# Patient Record
Sex: MALE | Race: WHITE | NOT HISPANIC OR LATINO | URBAN - METROPOLITAN AREA
[De-identification: names, ages, dates, MRNs, and addresses within clinical notes are randomized per-mention and may not be internally consistent; named-entity substitution may affect disease eponyms.]

---

## 2017-07-20 ENCOUNTER — INPATIENT (INPATIENT)
Facility: HOSPITAL | Age: 42
LOS: 0 days | Discharge: HOME | End: 2017-07-21
Attending: EMERGENCY MEDICINE

## 2017-07-27 DIAGNOSIS — R42 DIZZINESS AND GIDDINESS: ICD-10-CM

## 2017-07-27 DIAGNOSIS — R07.9 CHEST PAIN, UNSPECIFIED: ICD-10-CM

## 2017-07-27 DIAGNOSIS — K21.9 GASTRO-ESOPHAGEAL REFLUX DISEASE WITHOUT ESOPHAGITIS: ICD-10-CM

## 2017-07-27 DIAGNOSIS — R55 SYNCOPE AND COLLAPSE: ICD-10-CM

## 2017-07-27 DIAGNOSIS — R10.31 RIGHT LOWER QUADRANT PAIN: ICD-10-CM

## 2017-07-27 DIAGNOSIS — R06.02 SHORTNESS OF BREATH: ICD-10-CM

## 2018-01-12 ENCOUNTER — OUTPATIENT (OUTPATIENT)
Dept: OUTPATIENT SERVICES | Facility: HOSPITAL | Age: 43
LOS: 1 days | Discharge: HOME | End: 2018-01-12

## 2018-01-12 DIAGNOSIS — K21.9 GASTRO-ESOPHAGEAL REFLUX DISEASE WITHOUT ESOPHAGITIS: ICD-10-CM

## 2020-01-30 ENCOUNTER — TRANSCRIPTION ENCOUNTER (OUTPATIENT)
Age: 45
End: 2020-01-30

## 2020-01-31 ENCOUNTER — OUTPATIENT (OUTPATIENT)
Dept: OUTPATIENT SERVICES | Facility: HOSPITAL | Age: 45
LOS: 1 days | End: 2020-01-31

## 2020-01-31 ENCOUNTER — APPOINTMENT (OUTPATIENT)
Dept: OPHTHALMOLOGY | Facility: CLINIC | Age: 45
End: 2020-01-31

## 2020-02-03 DIAGNOSIS — H18.839 RECURRENT EROSION OF CORNEA, UNSPECIFIED EYE: ICD-10-CM

## 2020-02-03 DIAGNOSIS — H18.53 GRANULAR CORNEAL DYSTROPHY: ICD-10-CM

## 2021-07-09 PROBLEM — Z00.00 ENCOUNTER FOR PREVENTIVE HEALTH EXAMINATION: Status: ACTIVE | Noted: 2021-07-09

## 2021-07-30 ENCOUNTER — TRANSCRIPTION ENCOUNTER (OUTPATIENT)
Age: 46
End: 2021-07-30

## 2021-07-30 ENCOUNTER — INPATIENT (INPATIENT)
Facility: HOSPITAL | Age: 46
LOS: 2 days | Discharge: HOME | End: 2021-08-02
Attending: STUDENT IN AN ORGANIZED HEALTH CARE EDUCATION/TRAINING PROGRAM | Admitting: STUDENT IN AN ORGANIZED HEALTH CARE EDUCATION/TRAINING PROGRAM
Payer: COMMERCIAL

## 2021-07-30 VITALS
HEART RATE: 58 BPM | TEMPERATURE: 98 F | SYSTOLIC BLOOD PRESSURE: 128 MMHG | HEIGHT: 69 IN | WEIGHT: 162.92 LBS | DIASTOLIC BLOOD PRESSURE: 77 MMHG | RESPIRATION RATE: 18 BRPM | OXYGEN SATURATION: 98 %

## 2021-07-30 DIAGNOSIS — K21.9 GASTRO-ESOPHAGEAL REFLUX DISEASE WITHOUT ESOPHAGITIS: Chronic | ICD-10-CM

## 2021-07-30 LAB
ANION GAP SERPL CALC-SCNC: 9 MMOL/L — SIGNIFICANT CHANGE UP (ref 7–14)
APTT BLD: 30.4 SEC — SIGNIFICANT CHANGE UP (ref 27–39.2)
BUN SERPL-MCNC: 16 MG/DL — SIGNIFICANT CHANGE UP (ref 10–20)
CALCIUM SERPL-MCNC: 9.4 MG/DL — SIGNIFICANT CHANGE UP (ref 8.5–10.1)
CHLORIDE SERPL-SCNC: 100 MMOL/L — SIGNIFICANT CHANGE UP (ref 98–110)
CK MB CFR SERPL CALC: 1.6 NG/ML — SIGNIFICANT CHANGE UP (ref 0.6–6.3)
CK SERPL-CCNC: 84 U/L — SIGNIFICANT CHANGE UP (ref 0–225)
CO2 SERPL-SCNC: 26 MMOL/L — SIGNIFICANT CHANGE UP (ref 17–32)
CREAT SERPL-MCNC: 0.8 MG/DL — SIGNIFICANT CHANGE UP (ref 0.7–1.5)
GLUCOSE SERPL-MCNC: 108 MG/DL — HIGH (ref 70–99)
HCT VFR BLD CALC: 43.6 % — SIGNIFICANT CHANGE UP (ref 42–52)
HGB BLD-MCNC: 15.3 G/DL — SIGNIFICANT CHANGE UP (ref 14–18)
INR BLD: 0.98 RATIO — SIGNIFICANT CHANGE UP (ref 0.65–1.3)
MAGNESIUM SERPL-MCNC: 1.8 MG/DL — SIGNIFICANT CHANGE UP (ref 1.8–2.4)
MCHC RBC-ENTMCNC: 31.2 PG — HIGH (ref 27–31)
MCHC RBC-ENTMCNC: 35.1 G/DL — SIGNIFICANT CHANGE UP (ref 32–37)
MCV RBC AUTO: 89 FL — SIGNIFICANT CHANGE UP (ref 80–94)
NRBC # BLD: 0 /100 WBCS — SIGNIFICANT CHANGE UP (ref 0–0)
NT-PROBNP SERPL-SCNC: 17 PG/ML — SIGNIFICANT CHANGE UP (ref 0–300)
PLATELET # BLD AUTO: 181 K/UL — SIGNIFICANT CHANGE UP (ref 130–400)
POTASSIUM SERPL-MCNC: 4.2 MMOL/L — SIGNIFICANT CHANGE UP (ref 3.5–5)
POTASSIUM SERPL-SCNC: 4.2 MMOL/L — SIGNIFICANT CHANGE UP (ref 3.5–5)
PROTHROM AB SERPL-ACNC: 11.3 SEC — SIGNIFICANT CHANGE UP (ref 9.95–12.87)
RAPID RVP RESULT: SIGNIFICANT CHANGE UP
RBC # BLD: 4.9 M/UL — SIGNIFICANT CHANGE UP (ref 4.7–6.1)
RBC # FLD: 12.2 % — SIGNIFICANT CHANGE UP (ref 11.5–14.5)
SARS-COV-2 RNA SPEC QL NAA+PROBE: SIGNIFICANT CHANGE UP
SODIUM SERPL-SCNC: 135 MMOL/L — SIGNIFICANT CHANGE UP (ref 135–146)
TROPONIN T SERPL-MCNC: <0.01 NG/ML — SIGNIFICANT CHANGE UP
TROPONIN T SERPL-MCNC: <0.01 NG/ML — SIGNIFICANT CHANGE UP
WBC # BLD: 5.77 K/UL — SIGNIFICANT CHANGE UP (ref 4.8–10.8)
WBC # FLD AUTO: 5.77 K/UL — SIGNIFICANT CHANGE UP (ref 4.8–10.8)

## 2021-07-30 PROCEDURE — 93010 ELECTROCARDIOGRAM REPORT: CPT

## 2021-07-30 PROCEDURE — 99223 1ST HOSP IP/OBS HIGH 75: CPT

## 2021-07-30 PROCEDURE — 71045 X-RAY EXAM CHEST 1 VIEW: CPT | Mod: 26

## 2021-07-30 PROCEDURE — 99285 EMERGENCY DEPT VISIT HI MDM: CPT

## 2021-07-30 RX ORDER — PANTOPRAZOLE SODIUM 20 MG/1
40 TABLET, DELAYED RELEASE ORAL
Refills: 0 | Status: DISCONTINUED | OUTPATIENT
Start: 2021-07-30 | End: 2021-08-02

## 2021-07-30 RX ORDER — ESOMEPRAZOLE MAGNESIUM 40 MG/1
1 CAPSULE, DELAYED RELEASE ORAL
Qty: 0 | Refills: 0 | DISCHARGE

## 2021-07-30 RX ORDER — ATORVASTATIN CALCIUM 80 MG/1
40 TABLET, FILM COATED ORAL AT BEDTIME
Refills: 0 | Status: DISCONTINUED | OUTPATIENT
Start: 2021-07-30 | End: 2021-08-02

## 2021-07-30 RX ORDER — ASPIRIN/CALCIUM CARB/MAGNESIUM 324 MG
324 TABLET ORAL ONCE
Refills: 0 | Status: COMPLETED | OUTPATIENT
Start: 2021-07-30 | End: 2021-07-30

## 2021-07-30 RX ORDER — METOPROLOL TARTRATE 50 MG
12.5 TABLET ORAL EVERY 12 HOURS
Refills: 0 | Status: DISCONTINUED | OUTPATIENT
Start: 2021-07-30 | End: 2021-08-02

## 2021-07-30 RX ORDER — ENOXAPARIN SODIUM 100 MG/ML
40 INJECTION SUBCUTANEOUS DAILY
Refills: 0 | Status: DISCONTINUED | OUTPATIENT
Start: 2021-07-30 | End: 2021-08-02

## 2021-07-30 RX ORDER — CHLORHEXIDINE GLUCONATE 213 G/1000ML
1 SOLUTION TOPICAL
Refills: 0 | Status: DISCONTINUED | OUTPATIENT
Start: 2021-07-30 | End: 2021-08-02

## 2021-07-30 RX ORDER — ASPIRIN/CALCIUM CARB/MAGNESIUM 324 MG
81 TABLET ORAL DAILY
Refills: 0 | Status: DISCONTINUED | OUTPATIENT
Start: 2021-07-31 | End: 2021-08-02

## 2021-07-30 RX ORDER — IBUPROFEN 200 MG
600 TABLET ORAL EVERY 6 HOURS
Refills: 0 | Status: DISCONTINUED | OUTPATIENT
Start: 2021-07-30 | End: 2021-08-02

## 2021-07-30 RX ORDER — ESOMEPRAZOLE MAGNESIUM 40 MG/1
0 CAPSULE, DELAYED RELEASE ORAL
Qty: 0 | Refills: 0 | DISCHARGE

## 2021-07-30 RX ADMIN — Medication 324 MILLIGRAM(S): at 14:03

## 2021-07-30 NOTE — ED PROVIDER NOTE - PHYSICAL EXAMINATION
LOS:     VITALS:   T(C): 36.7 (07-30-21 @ 12:54), Max: 36.7 (07-30-21 @ 12:54)  HR: 58 (07-30-21 @ 12:54) (58 - 58)  BP: 128/77 (07-30-21 @ 12:54) (128/77 - 128/77)  RR: 18 (07-30-21 @ 12:54) (18 - 18)  SpO2: 98% (07-30-21 @ 12:54) (98% - 98%)    GENERAL: NAD, lying in bed comfortably  HEAD:  Atraumatic, Normocephalic  EYES: EOMI, PERRLA, conjunctiva and sclera clear  ENT: Moist mucous membranes  NECK: Supple, No JVD  CHEST/LUNG: Clear to auscultation bilaterally; No rales, rhonchi, wheezing, or rubs. Unlabored respirations  HEART: Regular rate and rhythm; No murmurs, rubs, or gallops  ABDOMEN: BSx4; Soft, nontender, nondistended  EXTREMITIES:  2+ Peripheral Pulses, brisk capillary refill. No clubbing, cyanosis, or edema  NERVOUS SYSTEM:  A&Ox3, no focal deficits   SKIN: No rashes or lesions

## 2021-07-30 NOTE — ED PROVIDER NOTE - ATTENDING CONTRIBUTION TO CARE
I personally evaluated the patient. I reviewed the Resident’s or Physician Assistant’s note (as assigned above), and agree with the findings and plan except as documented in my note.     46 male here for chest pain with exertion, has been present for about one week, worse today.     ROS otherwise unremarkable    PE: male in no distress. CV: pulses intact. CHEST: normal work of breathing. ABD: nondistended. SKIN: normal. EXT: FROM. NEURO: AAO 3 no focal deficits. HEENT: mucosa normal    Impression: chest pain    Plan: IV labs imaging supportive care and reevaluation

## 2021-07-30 NOTE — H&P ADULT - NSHPLABSRESULTS_GEN_ALL_CORE
15.3   5.77  )-----------( 181      ( 30 Jul 2021 13:10 )             43.6       07-30    135  |  100  |  16  ----------------------------<  108<H>  4.2   |  26  |  0.8    Ca    9.4      30 Jul 2021 13:10  Mg     1.8     07-30            Magnesium, Serum: 1.8 mg/dL (07-30-21 @ 13:10)        PT/INR - ( 30 Jul 2021 13:10 )   PT: 11.30 sec;   INR: 0.98 ratio         PTT - ( 30 Jul 2021 13:10 )  PTT:30.4 sec    Lactate Trend      CARDIAC MARKERS ( 30 Jul 2021 13:10 )  x     / <0.01 ng/mL / x     / x     / x          Xray Chest 1 View-PORTABLE IMMEDIATE (Xray Chest 1 View-PORTABLE IMMEDIATE .) (07.30.21 @ 13:58)   Impression:  No radiographic evidence of acute cardiopulmonary disease.

## 2021-07-30 NOTE — H&P ADULT - HISTORY OF PRESENT ILLNESS
Patient is a 46 year old Male with a past medical history of GERD S/P Nix Acid Reflux Surgery 2017 presented to the ER with a chief complaint of Chest pain x 1 week. Patient describes Left mid-pectoral non-radiating (initially towards Left shoulder then resolved) constant abrupt in onset 5/10 squeezing pressure like heavy chest pain, originated after jogging. Patient endorses that chief complaint is alleviated with resting, provoked by physical activity such as walking for a while and jogging. Patient denies fevers, chills, nausea, vomiting, new onset of headaches, visual changes, auditory changes, sore throat, neck stiffness, shortness of breath, palpitations, abdominal pain, flank pain, new onset of / worsening low back pain, diarrhea, constipation, dysuria, new onset of leg swelling, new onset of rashes, abnormal gait.

## 2021-07-30 NOTE — H&P ADULT - NSHPPHYSICALEXAM_GEN_ALL_CORE
VITAL SIGNS: Vital Signs Last 24 Hrs  T(C): 36.4 (30 Jul 2021 15:18), Max: 36.7 (30 Jul 2021 12:54)  T(F): 97.6 (30 Jul 2021 15:18), Max: 98 (30 Jul 2021 12:54)  HR: 66 (30 Jul 2021 15:18) (58 - 66)  BP: 116/62 (30 Jul 2021 15:18) (116/62 - 128/77)  RR: 100 (30 Jul 2021 15:18) (18 - 100)  SpO2: 98% (30 Jul 2021 12:54) (98% - 98%)    GENERAL: NAD, lying in bed  HEAD:  Atraumatic, Normocephalic  EYES: Conjunctiva and sclera clear  ENT: Moist mucous membranes  NECK: Supple  CHEST/LUNG: Clear to auscultation bilaterally. Unlabored respirations  HEART: Regular rate and rhythm  ABDOMEN: Soft, Nontender, Nondistended  EXTREMITIES:  No calf tenderness   NERVOUS SYSTEM:  Alert & Oriented X3, speech clear  MSK: FROM all 4 extremities, full and equal strength  SKIN: No rashes or lesions

## 2021-07-30 NOTE — ED PROVIDER NOTE - NS ED ROS FT
REVIEW OF SYSTEMS:    CONSTITUTIONAL: No weakness, fevers or chills  EYES/ENT: No visual changes;  No vertigo or throat pain   NECK: No pain or stiffness  RESPIRATORY: No cough, wheezing, hemoptysis; No shortness of breath  CARDIOVASCULAR: chest pain , worse on exertion  GASTROINTESTINAL: No abdominal or epigastric pain. No nausea, vomiting, or hematemesis; No diarrhea or constipation. No melena or hematochezia.  GENITOURINARY: No dysuria, frequency or hematuria  NEUROLOGICAL: No numbness or weakness  SKIN: No itching, rashes

## 2021-07-30 NOTE — ED ADULT NURSE NOTE - NSIMPLEMENTINTERV_GEN_ALL_ED
Implemented All Universal Safety Interventions:  [place] to call system. Call bell, personal items and telephone within reach. Instruct patient to call for assistance. Room bathroom lighting operational. Non-slip footwear when patient is off stretcher. Physically safe environment: no spills, clutter or unnecessary equipment. Stretcher in lowest position, wheels locked, appropriate side rails in place.

## 2021-07-30 NOTE — ED PROVIDER NOTE - OBJECTIVE STATEMENT
46 years old male with PMHx of GERD presents to ED for the chest pain since last one week.  Chest pain started about a week ago while he was running, left sided, non-radiating and got releived on rest. Since then he experiences pain when he tries to brisk walk. Pain is mid-sternal now, feels like squeezing/pressure like with associated left shoulder pain, light-headedness, dizziness. Today he went to urgent care [facility] from where he was sent to ED. Patient denies any SOB, palpitation, nausea, vomitting, diarrhea, constipation, family Hx of cardiac disease, smoking. 46 years old male with PMHx of GERD presents to ED for the chest pain since last one week.  Chest pain started about a week ago while he was running, left sided, non-radiating and got releived on rest. Since then he has been experiencing pain when he tries to brisk walk/exert. Pain is mid-sternal now, feels like squeezing/pressure like with associated left shoulder pain, light-headedness, dizziness. Today he went to urgent care center from where he was sent to ED. Patient denies any SOB, palpitation, nausea, vomitting, diarrhea, constipation, family Hx of cardiac disease, smoking.

## 2021-07-30 NOTE — H&P ADULT - ASSESSMENT
Patient is a 46 year old Male with a past medical history of GERD S/P Nix Acid Reflux Surgery 2017 presented to the ER with a chief complaint of Chest pain x 1 week.    # Chest pain, rule out ACS  - Tele monitoring for cardiac arrhythmias  - Transthoracic echocardiogram    - Three sets of Cardiac Enzymes to rule out MI  - Will order EKG   - CXR: No radiographic evidence of acute cardiopulmonary disease.   - Will order AM labs including CBC, CMP  - Aspirin 81mg, Lipitor 40mg   - Lipid profile in AM   - Hemoglobin A1C in AM for risk stratification   - Metoprolol 12.5 BID   - NSAIDs for pain control   - Cardiology consult     # GERD   - Nexium is nonformulary, will give Protonix equivalent   - GERD diet     # General admission orders   - Monitor Vital Signs   - Activity Order   - DVT PPX (Lovenox)  - GI PPX (Protonix)  - CHG 4% Bath QD and PRN  - Discussed the above case and plan with the Attending

## 2021-07-30 NOTE — ED PROVIDER NOTE - CLINICAL SUMMARY MEDICAL DECISION MAKING FREE TEXT BOX
46 male here for chest pain with exertion. No prior workup. Had screening labs imaging cardiac monitoring medications and reevaluation, plan is for inpatient admission to monitored setting for continued management. No acute emergent issues but patient has no outpatient followup and Heart score 3.

## 2021-07-30 NOTE — ED PROVIDER NOTE - PROGRESS NOTE DETAILS
46 years old male admitted with typical chest pain for one week. Pains gets worse with exertion and improves on rest.    Patient reports that pain is still there (pressure like), in the middle of chest, 3/10 in intensity.  - baseline blood work unremarkable.  - Troponin and Pro-BNP negative  - EKG shows NSR , Q-waves in inferior leads (were present in 2017) Patient endorsed to Dr. Ledesma (1430 hrs)

## 2021-07-30 NOTE — ED ADULT TRIAGE NOTE - CHIEF COMPLAINT QUOTE
patient complaining of chest pain and lightheadedness x 1 week that initially began while out for a job and is now present even at rest.

## 2021-07-30 NOTE — ED ADULT NURSE NOTE - PAIN: PRECIPITATING FACTORS
pain began 1 week ago while running and not has been present even at rest, today occurred while in car

## 2021-07-31 LAB
A1C WITH ESTIMATED AVERAGE GLUCOSE RESULT: 5.4 % — SIGNIFICANT CHANGE UP (ref 4–5.6)
ALBUMIN SERPL ELPH-MCNC: 4.5 G/DL — SIGNIFICANT CHANGE UP (ref 3.5–5.2)
ALP SERPL-CCNC: 57 U/L — SIGNIFICANT CHANGE UP (ref 30–115)
ALT FLD-CCNC: 16 U/L — SIGNIFICANT CHANGE UP (ref 0–41)
ANION GAP SERPL CALC-SCNC: 11 MMOL/L — SIGNIFICANT CHANGE UP (ref 7–14)
AST SERPL-CCNC: 19 U/L — SIGNIFICANT CHANGE UP (ref 0–41)
BASOPHILS # BLD AUTO: 0.03 K/UL — SIGNIFICANT CHANGE UP (ref 0–0.2)
BASOPHILS NFR BLD AUTO: 0.5 % — SIGNIFICANT CHANGE UP (ref 0–1)
BILIRUB SERPL-MCNC: 0.7 MG/DL — SIGNIFICANT CHANGE UP (ref 0.2–1.2)
BUN SERPL-MCNC: 14 MG/DL — SIGNIFICANT CHANGE UP (ref 10–20)
CALCIUM SERPL-MCNC: 9.4 MG/DL — SIGNIFICANT CHANGE UP (ref 8.5–10.1)
CHLORIDE SERPL-SCNC: 102 MMOL/L — SIGNIFICANT CHANGE UP (ref 98–110)
CHOLEST SERPL-MCNC: 185 MG/DL — SIGNIFICANT CHANGE UP
CK MB CFR SERPL CALC: 1.2 NG/ML — SIGNIFICANT CHANGE UP (ref 0.6–6.3)
CK SERPL-CCNC: 72 U/L — SIGNIFICANT CHANGE UP (ref 0–225)
CO2 SERPL-SCNC: 25 MMOL/L — SIGNIFICANT CHANGE UP (ref 17–32)
COVID-19 SPIKE DOMAIN AB INTERP: POSITIVE
COVID-19 SPIKE DOMAIN ANTIBODY RESULT: 76.6 U/ML — HIGH
CREAT SERPL-MCNC: 0.9 MG/DL — SIGNIFICANT CHANGE UP (ref 0.7–1.5)
EOSINOPHIL # BLD AUTO: 0.34 K/UL — SIGNIFICANT CHANGE UP (ref 0–0.7)
EOSINOPHIL NFR BLD AUTO: 6 % — SIGNIFICANT CHANGE UP (ref 0–8)
ESTIMATED AVERAGE GLUCOSE: 108 MG/DL — SIGNIFICANT CHANGE UP (ref 68–114)
GLUCOSE SERPL-MCNC: 89 MG/DL — SIGNIFICANT CHANGE UP (ref 70–99)
HCT VFR BLD CALC: 44.9 % — SIGNIFICANT CHANGE UP (ref 42–52)
HDLC SERPL-MCNC: 61 MG/DL — SIGNIFICANT CHANGE UP
HGB BLD-MCNC: 15.8 G/DL — SIGNIFICANT CHANGE UP (ref 14–18)
IMM GRANULOCYTES NFR BLD AUTO: 0.2 % — SIGNIFICANT CHANGE UP (ref 0.1–0.3)
LIPID PNL WITH DIRECT LDL SERPL: 117 MG/DL — HIGH
LYMPHOCYTES # BLD AUTO: 2.2 K/UL — SIGNIFICANT CHANGE UP (ref 1.2–3.4)
LYMPHOCYTES # BLD AUTO: 38.9 % — SIGNIFICANT CHANGE UP (ref 20.5–51.1)
MCHC RBC-ENTMCNC: 31.3 PG — HIGH (ref 27–31)
MCHC RBC-ENTMCNC: 35.2 G/DL — SIGNIFICANT CHANGE UP (ref 32–37)
MCV RBC AUTO: 88.9 FL — SIGNIFICANT CHANGE UP (ref 80–94)
MONOCYTES # BLD AUTO: 0.56 K/UL — SIGNIFICANT CHANGE UP (ref 0.1–0.6)
MONOCYTES NFR BLD AUTO: 9.9 % — HIGH (ref 1.7–9.3)
NEUTROPHILS # BLD AUTO: 2.51 K/UL — SIGNIFICANT CHANGE UP (ref 1.4–6.5)
NEUTROPHILS NFR BLD AUTO: 44.5 % — SIGNIFICANT CHANGE UP (ref 42.2–75.2)
NON HDL CHOLESTEROL: 124 MG/DL — SIGNIFICANT CHANGE UP
NRBC # BLD: 0 /100 WBCS — SIGNIFICANT CHANGE UP (ref 0–0)
PLATELET # BLD AUTO: 182 K/UL — SIGNIFICANT CHANGE UP (ref 130–400)
POTASSIUM SERPL-MCNC: 4.6 MMOL/L — SIGNIFICANT CHANGE UP (ref 3.5–5)
POTASSIUM SERPL-SCNC: 4.6 MMOL/L — SIGNIFICANT CHANGE UP (ref 3.5–5)
PROT SERPL-MCNC: 6.3 G/DL — SIGNIFICANT CHANGE UP (ref 6–8)
RBC # BLD: 5.05 M/UL — SIGNIFICANT CHANGE UP (ref 4.7–6.1)
RBC # FLD: 12.2 % — SIGNIFICANT CHANGE UP (ref 11.5–14.5)
SARS-COV-2 IGG+IGM SERPL QL IA: 76.6 U/ML — HIGH
SARS-COV-2 IGG+IGM SERPL QL IA: POSITIVE
SODIUM SERPL-SCNC: 138 MMOL/L — SIGNIFICANT CHANGE UP (ref 135–146)
TRIGL SERPL-MCNC: 97 MG/DL — SIGNIFICANT CHANGE UP
TROPONIN T SERPL-MCNC: <0.01 NG/ML — SIGNIFICANT CHANGE UP
WBC # BLD: 5.65 K/UL — SIGNIFICANT CHANGE UP (ref 4.8–10.8)
WBC # FLD AUTO: 5.65 K/UL — SIGNIFICANT CHANGE UP (ref 4.8–10.8)

## 2021-07-31 PROCEDURE — 93010 ELECTROCARDIOGRAM REPORT: CPT

## 2021-07-31 PROCEDURE — 99231 SBSQ HOSP IP/OBS SF/LOW 25: CPT

## 2021-07-31 PROCEDURE — 99252 IP/OBS CONSLTJ NEW/EST SF 35: CPT

## 2021-07-31 RX ADMIN — Medication 12.5 MILLIGRAM(S): at 18:45

## 2021-07-31 RX ADMIN — Medication 81 MILLIGRAM(S): at 13:30

## 2021-07-31 RX ADMIN — ATORVASTATIN CALCIUM 40 MILLIGRAM(S): 80 TABLET, FILM COATED ORAL at 21:23

## 2021-07-31 RX ADMIN — PANTOPRAZOLE SODIUM 40 MILLIGRAM(S): 20 TABLET, DELAYED RELEASE ORAL at 05:11

## 2021-07-31 RX ADMIN — Medication 1 TABLET(S): at 13:30

## 2021-07-31 NOTE — CONSULT NOTE ADULT - ASSESSMENT
Patient walk runs 3 times a week for 90 minutes. One week ago while running he developed sharp chest pain. Pain intermittent since then. Cardiac enzymes thus far negative. Consider stress thallium or cardiac CTA, Can consider as out patient. ASA Bata PPI for now

## 2021-07-31 NOTE — PROGRESS NOTE ADULT - ASSESSMENT
Patient is a 46 year old Male with a past medical history of GERD S/P Nix Acid Reflux Surgery 2017 presented to the ER with a chief complaint of Chest pain x 1 week.    # Chest pain, rule out ACS  # likely stable angina   - Tele monitoring for cardiac arrhythmias  - Transthoracic echocardiogram    - cardiac enzymes x3 negative   - EKG - no acute STT wave changes noted   - CXR: No radiographic evidence of acute cardiopulmonary disease.   - Aspirin 81mg, Lipitor 40mg   - a1c of 5.4, elevated LDL  - Metoprolol 12.5 BID   - NSAIDs for pain control   - Cardiology consult   - nuclear stress test or CT coronaries     # GERD   - Nexium is nonformulary, will give Protonix equivalent   - GERD diet     - DVT PPX (Lovenox)

## 2021-07-31 NOTE — CONSULT NOTE ADULT - SUBJECTIVE AND OBJECTIVE BOX
CARDIOLOGY CONSULT NOTE     CHIEF COMPLAINT/REASON FOR CONSULT:    HPI:  Patient is a 46 year old Male with a past medical history of GERD S/P Nix Acid Reflux Surgery 2017 presented to the ER with a chief complaint of Chest pain x 1 week. Patient describes Left mid-pectoral non-radiating (initially towards Left shoulder then resolved) constant abrupt in onset 5/10 squeezing pressure like heavy chest pain, originated after jogging. Patient endorses that chief complaint is alleviated with resting, provoked by physical activity such as walking for a while and jogging. Patient denies fevers, chills, nausea, vomiting, new onset of headaches, visual changes, auditory changes, sore throat, neck stiffness, shortness of breath, palpitations, abdominal pain, flank pain, new onset of / worsening low back pain, diarrhea, constipation, dysuria, new onset of leg swelling, new onset of rashes, abnormal gait.  (30 Jul 2021 15:42)      PAST MEDICAL & SURGICAL HISTORY:  GERD (gastroesophageal reflux disease)    GERD (gastroesophageal reflux disease)  - Nix Reflux procedure 2017        Cardiac Risks:   [ ]HTN, [ ] DM, [ ] Smoking, [ ] FH,  [ x] Lipids        MEDICATIONS:  MEDICATIONS  (STANDING):  aspirin  chewable 81 milliGRAM(s) Oral daily  atorvastatin 40 milliGRAM(s) Oral at bedtime  chlorhexidine 4% Liquid 1 Application(s) Topical <User Schedule>  enoxaparin Injectable 40 milliGRAM(s) SubCutaneous daily  metoprolol tartrate 12.5 milliGRAM(s) Oral every 12 hours  multivitamin 1 Tablet(s) Oral daily  pantoprazole    Tablet 40 milliGRAM(s) Oral before breakfast      FAMILY HISTORY:  FH: diabetes mellitus (Father)        SOCIAL HISTORY:      [ ] Marital status    Allergies    No Known Allergies        	    REVIEW OF SYSTEMS:  CONSTITUTIONAL: No fever, weight loss, or fatigue  EYES: No eye pain, visual disturbances, or discharge  ENMT:  No difficulty hearing, tinnitus, vertigo; No sinus or throat pain  NECK: No pain or stiffness  RESPIRATORY: No cough, wheezing, chills or hemoptysis; No Shortness of Breath  CARDIOVASCULAR: See above  GASTROINTESTINAL: No abdominal or epigastric pain. No nausea, vomiting, or hematemesis; No diarrhea or constipation. No melena or hematochezia.  GENITOURINARY: No dysuria, frequency, hematuria, or incontinence  NEUROLOGICAL: No headaches, memory loss, loss of strength, numbness, or tremors  SKIN: No itching, burning, rashes, or lesions   	        PHYSICAL EXAM:  T(C): 35.8 (07-31-21 @ 05:50), Max: 36.7 (07-30-21 @ 12:54)  HR: 52 (07-31-21 @ 05:50) (52 - 66)  BP: 98/60 (07-31-21 @ 05:50) (98/60 - 128/77)  RR: 18 (07-31-21 @ 05:50) (18 - 100)  SpO2: 98% (07-30-21 @ 12:54) (98% - 98%)  Wt(kg): --  I&O's Summary      Appearance: Normal	  Psychiatry: A & O x 3, Mood & affect appropriate  HEENT:   Normal oral mucosa, PERRL, EOMI	  Lymphatic: No lymphadenopathy  Cardiovascular: Normal S1 S2,RRR, No JVD, No murmurs  Respiratory: Lungs clear to auscultation	  Gastrointestinal:  Soft, Non-tender, + BS	  Skin: No rashes, No ecchymoses, No cyanosis	  Neurologic: Non-focal  Extremities: Normal range of motion, No clubbing, cyanosis or edema  Vascular: Peripheral pulses palpable 2+ bilaterally      ECG:  	< from: 12 Lead ECG (07.30.21 @ 12:52) >    Diagnosis Line Normal sinus rhythm  Normal ECG    Confirmed by CHAD GOODMAN JEREMY ([id number]) on 7/30/2021 4:57:34 PM    < end of copied text >      	  LABS:	 	    CARDIAC MARKERS:          Serum Pro-Brain Natriuretic Peptide: 17 pg/mL (07-30 @ 13:10)                            15.8   5.65  )-----------( 182      ( 31 Jul 2021 07:16 )             44.9     07-31    138  |  102  |  14  ----------------------------<  89  4.6   |  25  |  0.9    Ca    9.4      31 Jul 2021 07:16  Mg     1.8     07-30    TPro  6.3  /  Alb  4.5  /  TBili  0.7  /  DBili  x   /  AST  19  /  ALT  16  /  AlkPhos  57  07-31    PT/INR - ( 30 Jul 2021 13:10 )   PT: 11.30 sec;   INR: 0.98 ratio         PTT - ( 30 Jul 2021 13:10 )  PTT:30.4 sec  proBNP: Serum Pro-Brain Natriuretic Peptide: 17 pg/mL (07-30 @ 13:10)

## 2021-08-01 PROCEDURE — 99231 SBSQ HOSP IP/OBS SF/LOW 25: CPT

## 2021-08-01 PROCEDURE — 93010 ELECTROCARDIOGRAM REPORT: CPT

## 2021-08-01 PROCEDURE — 99233 SBSQ HOSP IP/OBS HIGH 50: CPT

## 2021-08-01 RX ADMIN — PANTOPRAZOLE SODIUM 40 MILLIGRAM(S): 20 TABLET, DELAYED RELEASE ORAL at 08:13

## 2021-08-01 RX ADMIN — Medication 1 TABLET(S): at 13:31

## 2021-08-01 RX ADMIN — Medication 81 MILLIGRAM(S): at 13:31

## 2021-08-01 NOTE — PROGRESS NOTE ADULT - SUBJECTIVE AND OBJECTIVE BOX
Patient is a 46y old  Male who presents with a chief complaint of Chest pain (01 Aug 2021 07:45)      INTERVAL HPI/OVERNIGHT EVENTS: no acute overnight events noted     REVIEW OF SYSTEMS: negative  Vital Signs Last 24 Hrs  T(C): 35.7 (01 Aug 2021 05:09), Max: 36.1 (31 Jul 2021 14:33)  T(F): 96.3 (01 Aug 2021 05:09), Max: 97 (31 Jul 2021 14:33)  HR: 55 (01 Aug 2021 05:09) (53 - 64)  BP: 101/51 (01 Aug 2021 05:09) (100/59 - 118/64)  BP(mean): --  RR: 16 (01 Aug 2021 05:09) (16 - 18)  SpO2: --    PHYSICAL EXAM:   NAD; Normocephalic;   LUNGS - no wheezing  HEART: S1 S2+   ABDOMEN: Soft, Nontender, non distended  EXTREMITIES: no cyanosis; no edema  NERVOUS SYSTEM:  Awake and alert; no focal neuro deficits appreciated    LABS:                        15.8   5.65  )-----------( 182      ( 31 Jul 2021 07:16 )             44.9     07-31    138  |  102  |  14  ----------------------------<  89  4.6   |  25  |  0.9    Ca    9.4      31 Jul 2021 07:16  Mg     1.8     07-30    TPro  6.3  /  Alb  4.5  /  TBili  0.7  /  DBili  x   /  AST  19  /  ALT  16  /  AlkPhos  57  07-31    PT/INR - ( 30 Jul 2021 13:10 )   PT: 11.30 sec;   INR: 0.98 ratio         PTT - ( 30 Jul 2021 13:10 )  PTT:30.4 sec    CAPILLARY BLOOD GLUCOSE          Medications:  MEDICATIONS  (STANDING):  aspirin  chewable 81 milliGRAM(s) Oral daily  atorvastatin 40 milliGRAM(s) Oral at bedtime  chlorhexidine 4% Liquid 1 Application(s) Topical <User Schedule>  enoxaparin Injectable 40 milliGRAM(s) SubCutaneous daily  metoprolol tartrate 12.5 milliGRAM(s) Oral every 12 hours  multivitamin 1 Tablet(s) Oral daily  pantoprazole    Tablet 40 milliGRAM(s) Oral before breakfast    MEDICATIONS  (PRN):  ibuprofen  Tablet. 600 milliGRAM(s) Oral every 6 hours PRN Temp greater or equal to 38C (100.4F), Mild Pain (1 - 3)  
 Patient is a 46y old  Male who presents with a chief complaint of Chest pain (31 Jul 2021 16:08)      T(F): 96.3 (08-01-21 @ 05:09), Max: 97 (07-31-21 @ 14:33)  HR: 55 (08-01-21 @ 05:09)  BP: 101/51 (08-01-21 @ 05:09)  RR: 16 (08-01-21 @ 05:09)  SpO2: --    PHYSICAL EXAM:  GENERAL: NAD, well-groomed, well-developed  HEAD:  Atraumatic, Normocephalic  EYES: EOMI, PERRLA, conjunctiva and sclera clear  ENMT: No tonsillar erythema, exudates, or enlargement; Moist mucous membranes, Good dentition, No lesions  NECK: Supple, No JVD, Normal thyroid  NERVOUS SYSTEM:  Alert & Oriented X3,  Motor Strength 5/5 B/L upper and lower extremities  CHEST/LUNG: Clear to percussion bilaterally; No rales, rhonchi, wheezing, or rubs  HEART: Regular rate and rhythm; No murmurs, rubs, or gallops  ABDOMEN: Soft, Nontender, Nondistended; Bowel sounds present  EXTREMITIES:   No clubbing, cyanosis, or edema  LYMPH: No lymphadenopathy noted  SKIN: No rashes or lesions    labs  07-31    138  |  102  |  14  ----------------------------<  89  4.6   |  25  |  0.9    Ca    9.4      31 Jul 2021 07:16  Mg     1.8     07-30    TPro  6.3  /  Alb  4.5  /  TBili  0.7  /  DBili  x   /  AST  19  /  ALT  16  /  AlkPhos  57  07-31                          15.8   5.65  )-----------( 182      ( 31 Jul 2021 07:16 )             44.9       PT/INR - ( 30 Jul 2021 13:10 )   PT: 11.30 sec;   INR: 0.98 ratio         PTT - ( 30 Jul 2021 13:10 )  PTT:30.4 sec        aspirin  chewable 81 milliGRAM(s) Oral daily  atorvastatin 40 milliGRAM(s) Oral at bedtime  chlorhexidine 4% Liquid 1 Application(s) Topical <User Schedule>  enoxaparin Injectable 40 milliGRAM(s) SubCutaneous daily  ibuprofen  Tablet. 600 milliGRAM(s) Oral every 6 hours PRN  metoprolol tartrate 12.5 milliGRAM(s) Oral every 12 hours  multivitamin 1 Tablet(s) Oral daily  pantoprazole    Tablet 40 milliGRAM(s) Oral before breakfast  
Patient is a 46y old  Male who presents with a chief complaint of Chest pain (31 Jul 2021 09:04)      INTERVAL HPI/OVERNIGHT EVENTS: no acute overnight events noted   no chest pain at rest     REVIEW OF SYSTEMS: negative  Vital Signs Last 24 Hrs  T(C): 36.1 (31 Jul 2021 14:33), Max: 36.1 (31 Jul 2021 14:33)  T(F): 97 (31 Jul 2021 14:33), Max: 97 (31 Jul 2021 14:33)  HR: 64 (31 Jul 2021 14:33) (52 - 64)  BP: 118/64 (31 Jul 2021 14:33) (98/60 - 118/64)  BP(mean): --  RR: 18 (31 Jul 2021 05:50) (18 - 18)  SpO2: --    PHYSICAL EXAM:   NAD; Normocephalic;   LUNGS - no wheezing  HEART: S1 S2+   ABDOMEN: Soft, Nontender, non distended  EXTREMITIES: no cyanosis; no edema  NERVOUS SYSTEM:  Awake and alert; no focal neuro deficits appreciated    LABS:                        15.8   5.65  )-----------( 182      ( 31 Jul 2021 07:16 )             44.9     07-31    138  |  102  |  14  ----------------------------<  89  4.6   |  25  |  0.9    Ca    9.4      31 Jul 2021 07:16  Mg     1.8     07-30    TPro  6.3  /  Alb  4.5  /  TBili  0.7  /  DBili  x   /  AST  19  /  ALT  16  /  AlkPhos  57  07-31    PT/INR - ( 30 Jul 2021 13:10 )   PT: 11.30 sec;   INR: 0.98 ratio         PTT - ( 30 Jul 2021 13:10 )  PTT:30.4 sec    CAPILLARY BLOOD GLUCOSE          Medications:  MEDICATIONS  (STANDING):  aspirin  chewable 81 milliGRAM(s) Oral daily  atorvastatin 40 milliGRAM(s) Oral at bedtime  chlorhexidine 4% Liquid 1 Application(s) Topical <User Schedule>  enoxaparin Injectable 40 milliGRAM(s) SubCutaneous daily  metoprolol tartrate 12.5 milliGRAM(s) Oral every 12 hours  multivitamin 1 Tablet(s) Oral daily  pantoprazole    Tablet 40 milliGRAM(s) Oral before breakfast    MEDICATIONS  (PRN):  ibuprofen  Tablet. 600 milliGRAM(s) Oral every 6 hours PRN Temp greater or equal to 38C (100.4F), Mild Pain (1 - 3)

## 2021-08-01 NOTE — PROGRESS NOTE ADULT - TIME BILLING
For clinical care, patient education and care coordination.
For clinical care, patient education and care coordination.

## 2021-08-01 NOTE — PROGRESS NOTE ADULT - ASSESSMENT
Patient claims tingling chest walking. No mi by enzymes. Ambulate. Stress or Cardiac CTA. can consider as out patient

## 2021-08-01 NOTE — PROGRESS NOTE ADULT - ASSESSMENT
Patient is a 46 year old Male with a past medical history of GERD S/P Nix Acid Reflux Surgery 2017 presented to the ER with a chief complaint of Chest pain x 1 week.    # Chest pain, rule out ACS  # likely stable angina   - Tele monitoring for cardiac arrhythmias  - Transthoracic echocardiogram    - cardiac enzymes x3 negative   - EKG - no acute STT wave changes noted   - CXR: No radiographic evidence of acute cardiopulmonary disease.   - Aspirin 81mg, Lipitor 40mg   - a1c of 5.4, elevated LDL  - Metoprolol 12.5 BID   - NSAIDs for pain control   - Cardiology consult   - nuclear stress test or CT coronaries     # GERD   - Nexium is nonformulary, will give Protonix equivalent   - GERD diet     - DVT PPX (Lovenox)    can be discharged if stress/CT coronaries comes back negative    Patient is a 46 year old Male with a past medical history of GERD S/P Nix Acid Reflux Surgery 2017 presented to the ER with a chief complaint of Chest pain x 1 week.    # Chest pain, rule out ACS  # likely stable angina   - Tele monitoring for cardiac arrhythmias  - Transthoracic echocardiogram    - cardiac enzymes x3 negative   - EKG - no acute STT wave changes noted   - CXR: No radiographic evidence of acute cardiopulmonary disease.   - Aspirin 81mg, Lipitor 40mg   - a1c of 5.4, elevated LDL  - Metoprolol 12.5 BID   - NSAIDs for pain control   - Cardiology consult   - stress test tomorrow AM --> discharge planning if negative     # GERD   - Nexium is nonformulary, will give Protonix equivalent   - GERD diet     - DVT PPX (Lovenox)    can be discharged if stress/CT coronaries comes back negative

## 2021-08-02 ENCOUNTER — TRANSCRIPTION ENCOUNTER (OUTPATIENT)
Age: 46
End: 2021-08-02

## 2021-08-02 VITALS
TEMPERATURE: 97 F | RESPIRATION RATE: 16 BRPM | SYSTOLIC BLOOD PRESSURE: 108 MMHG | DIASTOLIC BLOOD PRESSURE: 62 MMHG | HEART RATE: 58 BPM

## 2021-08-02 PROCEDURE — 93018 CV STRESS TEST I&R ONLY: CPT

## 2021-08-02 PROCEDURE — 78452 HT MUSCLE IMAGE SPECT MULT: CPT | Mod: 26

## 2021-08-02 PROCEDURE — 99239 HOSP IP/OBS DSCHRG MGMT >30: CPT

## 2021-08-02 PROCEDURE — 93010 ELECTROCARDIOGRAM REPORT: CPT

## 2021-08-02 PROCEDURE — 93016 CV STRESS TEST SUPVJ ONLY: CPT

## 2021-08-02 NOTE — DISCHARGE NOTE PROVIDER - HOSPITAL COURSE
HPI:  Patient is a 46 year old Male with a past medical history of GERD S/P Nix Acid Reflux Surgery 2017 presented to the ER with a chief complaint of Chest pain x 1 week. Patient describes Left mid-pectoral non-radiating (initially towards Left shoulder then resolved) constant abrupt in onset 5/10 squeezing pressure like heavy chest pain, originated after jogging. Patient endorses that chief complaint is alleviated with resting, provoked by physical activity such as walking for a while and jogging. Patient denies fevers, chills, nausea, vomiting, new onset of headaches, visual changes, auditory changes, sore throat, neck stiffness, shortness of breath, palpitations, abdominal pain, flank pain, new onset of / worsening low back pain, diarrhea, constipation, dysuria, new onset of leg swelling, new onset of rashes, abnormal gait.  (30 Jul 2021 15:42)      Hospital course:   patient was admitted with exertional chest pain   3 sets of cardiac enzymes noted negative   no acute changes noted on ekg   on admission aspirin, statin and metoprolol started   stress test obtained --> < from: NM Nuclear Stress Multiple (08.02.21 @ 12:37) >    Impression:  1. NORMAL STRESS AND REST MYOCARDIAL PERFUSION SPECT TOMOGRAPHY, WITH NO EVIDENCE FOR ISCHEMIA AT THE LEVEL OF EXERCISE ATTAINED.  2. NORMAL RESTING LEFT VENTRICULAR WALL MOTION AND WALL THICKENING.  3. LEFT VENTRICULAR EJECTION FRACTION OF 68 % WHICH IS WITHIN RANGE OF NORMAL.    < end of copied text >    patient is hemodynamically stable and ready for discharge.

## 2021-08-02 NOTE — DISCHARGE NOTE NURSING/CASE MANAGEMENT/SOCIAL WORK - PATIENT PORTAL LINK FT
You can access the FollowMyHealth Patient Portal offered by [facility] by registering at the following website: [email or web address]. By joining [employer]’s FollowMyHealth portal, you will also be able to view your health information using other applications (apps) compatible with our system.

## 2021-08-02 NOTE — DISCHARGE NOTE PROVIDER - CARE PROVIDER_API CALL
Magalie Lanier  INTERNAL MEDICINE  [address] [place] [address]  Phone: [phone]  Fax: [phone]  Established Patient  Follow Up Time: 1 month    Francisco Javier Busby)  Cardiovascular Disease; Internal Medicine  [address] [place] [address]  Phone: [phone]  Fax: [phone]  Follow Up Time: Routine

## 2021-08-02 NOTE — DISCHARGE NOTE PROVIDER - PROVIDER TOKENS
PROVIDER:[TOKEN:[[id number]],FOLLOWUP:[1 month],ESTABLISHEDPATIENT:[T]],PROVIDER:[TOKEN:[[id number]],FOLLOWUP:[Routine]]

## 2021-08-02 NOTE — DISCHARGE NOTE PROVIDER - NSDCCPCAREPLAN_GEN_ALL_CORE_FT
PRINCIPAL DISCHARGE DIAGNOSIS  Diagnosis: Chest pain  Assessment and Plan of Treatment: - Likely muscluoskeletal.  - cardiac enzymes, EKG and stress test noted negative   - advil as needed can be used for pain control   - LDL level of 117 noted - diet and lifestyle modification   - follow up with PCP for repeat check and possible need to start medication for cholesterol

## 2021-08-02 NOTE — DISCHARGE NOTE PROVIDER - NSDCCPTREATMENT_GEN_ALL_CORE_FT
PRINCIPAL PROCEDURE  Procedure: Exercise stress test  Findings and Treatment: 1. NORMAL STRESS AND REST MYOCARDIAL PERFUSION SPECT TOMOGRAPHY, WITH NO EVIDENCE FOR ISCHEMIA AT THE LEVEL OF EXERCISE ATTAINED.  2. NORMAL RESTING LEFT VENTRICULAR WALL MOTION AND WALL THICKENING.  3. LEFT VENTRICULAR EJECTION FRACTION OF 68 % WHICH IS WITHIN RANGE OF NORMAL.

## 2021-08-05 DIAGNOSIS — R07.9 CHEST PAIN, UNSPECIFIED: ICD-10-CM

## 2021-08-05 DIAGNOSIS — R07.89 OTHER CHEST PAIN: ICD-10-CM

## 2021-08-05 DIAGNOSIS — K21.9 GASTRO-ESOPHAGEAL REFLUX DISEASE WITHOUT ESOPHAGITIS: ICD-10-CM

## 2021-10-11 ENCOUNTER — APPOINTMENT (OUTPATIENT)
Dept: CARDIOLOGY | Facility: CLINIC | Age: 46
End: 2021-10-11

## 2021-11-04 ENCOUNTER — APPOINTMENT (OUTPATIENT)
Dept: GASTROENTEROLOGY | Facility: CLINIC | Age: 46
End: 2021-11-04

## 2025-05-14 NOTE — DISCHARGE NOTE PROVIDER - NSDCQMAMI_CARD_ALL_CORE
[facility]  Rooming Progress Note      Severity - Current is 4/10; best is 4/10; worst is 8/10.  Sitting tolerance - 30-60 minutes  Walking tolerance - 30-60 minutes  Any new numbness or weakness in your limbs since your last visit with Dr. Nichols - No  Any new medical condition since your last visit with Dr. Nichols - No  Any significant change in your lifestyle since your last visit with Dr. Nichols - No  Any new treatment/x-rays/MRI since your last visit with Dr. Nichols - No  Any sleep disturbance - Yes, initiating and maintaining  Any aberrant behavior - No  Any positive urine drug screen-  No  Pill count performed - NA consistent? NA  If applicable, is patient taking their prescribed pain medication appropriately? Yes  If applicable, are they experiencing any side effects from their current medication regimen? No  If applicable, do they feel their pain medication is working? Yes  Performing a home exercise and stretching regimen? No    REVIEW OF SYSTEMS:  Pertinent positive ROS are check marked ([x]).  All other ROS are negative.    Constitutional:  []  Chills  []  Fatigue  []  Fever  [x]  Insomnia  []  Weight Gain  []  Weight Loss    Musculoskeletal:  []  Joint Pain  []  Leg Pain  []  Arm Pain  [x]  Low Back Pain  [x]  Mid Back Pain  []  Neck Pain  []  Cold Feet  []  Varicose Veins   Neuro/Psychiatric:   []  Anxiety  []  Depression  []  Fainting  []  Headache  []  Clumsiness  []  Memory Loss  []  Seizures             The documentation recorded by the staff accurately and completely reflects the service(s) personally performed and the decisions made by Dr. Nichols.     No

## 2025-06-23 PROBLEM — K21.9 GASTRO-ESOPHAGEAL REFLUX DISEASE WITHOUT ESOPHAGITIS: Chronic | Status: ACTIVE | Noted: 2021-07-30

## 2025-08-12 ENCOUNTER — APPOINTMENT (OUTPATIENT)
Dept: CARDIOLOGY | Facility: CLINIC | Age: 50
End: 2025-08-12
Payer: COMMERCIAL

## 2025-08-12 VITALS
HEIGHT: 69 IN | DIASTOLIC BLOOD PRESSURE: 80 MMHG | SYSTOLIC BLOOD PRESSURE: 130 MMHG | BODY MASS INDEX: 26.66 KG/M2 | HEART RATE: 76 BPM | OXYGEN SATURATION: 96 % | WEIGHT: 180 LBS

## 2025-08-12 DIAGNOSIS — Z00.00 ENCOUNTER FOR GENERAL ADULT MEDICAL EXAMINATION W/OUT ABNORMAL FINDINGS: ICD-10-CM

## 2025-08-12 DIAGNOSIS — R06.02 SHORTNESS OF BREATH: ICD-10-CM

## 2025-08-12 PROCEDURE — 99203 OFFICE O/P NEW LOW 30 MIN: CPT

## 2025-08-12 RX ORDER — ESOMEPRAZOLE MAGNESIUM 40 MG/1
40 CAPSULE, DELAYED RELEASE ORAL
Refills: 0 | Status: ACTIVE | COMMUNITY

## 2025-08-18 ENCOUNTER — NON-APPOINTMENT (OUTPATIENT)
Age: 50
End: 2025-08-18

## 2025-08-18 LAB
ALBUMIN SERPL ELPH-MCNC: 4.8 G/DL
ALP BLD-CCNC: 60 U/L
ALT SERPL-CCNC: 23 U/L
ANION GAP SERPL CALC-SCNC: 14 MMOL/L
APO B SERPL-MCNC: 99 MG/DL
APO LP(A) SERPL-MCNC: 34.1 NMOL/L
AST SERPL-CCNC: 25 U/L
BILIRUB SERPL-MCNC: 0.3 MG/DL
BUN SERPL-MCNC: 10 MG/DL
CALCIUM SERPL-MCNC: 9.3 MG/DL
CHLORIDE SERPL-SCNC: 101 MMOL/L
CHOLEST SERPL-MCNC: 227 MG/DL
CO2 SERPL-SCNC: 24 MMOL/L
CREAT SERPL-MCNC: 0.8 MG/DL
EGFRCR SERPLBLD CKD-EPI 2021: 107 ML/MIN/1.73M2
ESTIMATED AVERAGE GLUCOSE: 111 MG/DL
GLUCOSE SERPL-MCNC: 90 MG/DL
HBA1C MFR BLD HPLC: 5.5 %
HCT VFR BLD CALC: 45.6 %
HDLC SERPL-MCNC: 58 MG/DL
HGB BLD-MCNC: 15.5 G/DL
LDLC SERPL-MCNC: 148 MG/DL
MCHC RBC-ENTMCNC: 30.8 PG
MCHC RBC-ENTMCNC: 34 G/DL
MCV RBC AUTO: 90.5 FL
NONHDLC SERPL-MCNC: 169 MG/DL
PLATELET # BLD AUTO: 205 K/UL
PMV BLD AUTO: 0 /100 WBCS
PMV BLD: 10.8 FL
POTASSIUM SERPL-SCNC: 4.6 MMOL/L
PROT SERPL-MCNC: 7.3 G/DL
RBC # BLD: 5.04 M/UL
RBC # FLD: 13.2 %
SODIUM SERPL-SCNC: 139 MMOL/L
TRIGL SERPL-MCNC: 118 MG/DL
TSH SERPL-ACNC: 1.97 UIU/ML
WBC # FLD AUTO: 5.87 K/UL

## 2025-09-02 ENCOUNTER — OUTPATIENT (OUTPATIENT)
Dept: OUTPATIENT SERVICES | Facility: HOSPITAL | Age: 50
LOS: 1 days | End: 2025-09-02
Payer: COMMERCIAL

## 2025-09-02 ENCOUNTER — RESULT REVIEW (OUTPATIENT)
Age: 50
End: 2025-09-02

## 2025-09-02 ENCOUNTER — APPOINTMENT (OUTPATIENT)
Dept: CV DIAGNOSITCS | Facility: HOSPITAL | Age: 50
End: 2025-09-02
Payer: COMMERCIAL

## 2025-09-02 DIAGNOSIS — K21.9 GASTRO-ESOPHAGEAL REFLUX DISEASE WITHOUT ESOPHAGITIS: Chronic | ICD-10-CM

## 2025-09-02 DIAGNOSIS — R06.02 SHORTNESS OF BREATH: ICD-10-CM

## 2025-09-02 PROCEDURE — 93306 TTE W/DOPPLER COMPLETE: CPT | Mod: 26

## 2025-09-02 PROCEDURE — 93306 TTE W/DOPPLER COMPLETE: CPT

## 2025-09-03 DIAGNOSIS — R06.02 SHORTNESS OF BREATH: ICD-10-CM

## 2025-09-12 ENCOUNTER — APPOINTMENT (OUTPATIENT)
Dept: CARDIOLOGY | Facility: CLINIC | Age: 50
End: 2025-09-12